# Patient Record
Sex: FEMALE | Race: WHITE | NOT HISPANIC OR LATINO | Employment: FULL TIME | URBAN - METROPOLITAN AREA
[De-identification: names, ages, dates, MRNs, and addresses within clinical notes are randomized per-mention and may not be internally consistent; named-entity substitution may affect disease eponyms.]

---

## 2020-12-11 ENCOUNTER — HOSPITAL ENCOUNTER (EMERGENCY)
Facility: CLINIC | Age: 30
Discharge: HOME | End: 2020-12-11
Attending: EMERGENCY MEDICINE | Admitting: EMERGENCY MEDICINE
Payer: COMMERCIAL

## 2020-12-11 VITALS
HEART RATE: 79 BPM | DIASTOLIC BLOOD PRESSURE: 79 MMHG | BODY MASS INDEX: 22.6 KG/M2 | WEIGHT: 140 LBS | SYSTOLIC BLOOD PRESSURE: 129 MMHG | TEMPERATURE: 99.8 F | RESPIRATION RATE: 16 BRPM

## 2020-12-11 DIAGNOSIS — F22 PARANOIA (H): ICD-10-CM

## 2020-12-11 DIAGNOSIS — F41.9 ANXIETY: ICD-10-CM

## 2020-12-11 DIAGNOSIS — R35.0 URINARY FREQUENCY: ICD-10-CM

## 2020-12-11 LAB
ALBUMIN UR-MCNC: NEGATIVE MG/DL
APPEARANCE UR: CLEAR
BILIRUB UR QL STRIP: NEGATIVE
COLOR UR AUTO: ABNORMAL
GLUCOSE UR STRIP-MCNC: NEGATIVE MG/DL
HGB UR QL STRIP: NEGATIVE
KETONES UR STRIP-MCNC: NEGATIVE MG/DL
LEUKOCYTE ESTERASE UR QL STRIP: NEGATIVE
MUCOUS THREADS #/AREA URNS LPF: PRESENT /LPF
NITRATE UR QL: NEGATIVE
PH UR STRIP: 5.5 PH (ref 5–7)
RBC #/AREA URNS AUTO: 1 /HPF (ref 0–2)
SOURCE: ABNORMAL
SP GR UR STRIP: 1.01 (ref 1–1.03)
SQUAMOUS #/AREA URNS AUTO: 2 /HPF (ref 0–1)
UROBILINOGEN UR STRIP-MCNC: NORMAL MG/DL (ref 0–2)
WBC #/AREA URNS AUTO: 1 /HPF (ref 0–5)

## 2020-12-11 PROCEDURE — 99285 EMERGENCY DEPT VISIT HI MDM: CPT | Mod: 25

## 2020-12-11 PROCEDURE — 81001 URINALYSIS AUTO W/SCOPE: CPT | Performed by: EMERGENCY MEDICINE

## 2020-12-11 PROCEDURE — 90791 PSYCH DIAGNOSTIC EVALUATION: CPT

## 2020-12-11 ASSESSMENT — ENCOUNTER SYMPTOMS
DIARRHEA: 0
PALPITATIONS: 1
FEVER: 0
VOMITING: 0
HALLUCINATIONS: 0
NERVOUS/ANXIOUS: 1
DYSURIA: 0
COUGH: 0

## 2020-12-11 NOTE — ED AVS SNAPSHOT
[facility]  [address]  [facility] [address]  Phone: [phone]  Fax: [phone]                                    Danni Li   MRN: [id number]    Department: [facility]   Date of Visit: 12/11/2020           After Visit Summary Signature Page    I have received my discharge instructions, and my questions have been answered. I have discussed any challenges I see with this plan with the nurse or doctor.    ..........................................................................................................................................  Patient/Patient Representative Signature      ..........................................................................................................................................  Patient Representative Print Name and Relationship to Patient    ..................................................               ................................................  Date                                   Time    ..........................................................................................................................................  Reviewed by Signature/Title    ...................................................              ..............................................  Date                                               Time          22EPIC Rev 08/18

## 2020-12-11 NOTE — ED PROVIDER NOTES
"  History     Chief Complaint:  Paranoia     HPI   Danni Delaney is a 30 year old female with history of ADD, anxiety, depression, and UTI's who presents for evaluation of paranoia/amxiety and urinary frequency. The patient reports that she was at work today, as a pharmaceutical technician, when she started to feel \"scared and disorientated\", further describing that her heart was racing and palpating. She thinks that she possibly could have had a panic attack at work and this was very sudden for her. The patient continues to report that she had a follow up with her psychiatrist on 11/22 which she was re-prescribed Abilify (stopped due to lapse in insurance) and started taking this sometime since then and has been on this for over a week. Since restarting it, she has had increasing anxiety and paranoia. She describes the paranoia to be that she is hearing other people's conversations and is \"picking words out\" that she believes are about her. The patient states that this paranoia ebs and flows and she has not had this for a very long time. She continues to note that she has had similar symptoms in the past, possibly in 2014, and she had been admitted to [facility]. The patient was recently seen at [facility] as well where she was diagnosed with a UTI and she finished a course of Macrobid. She denies any current dysuria, fever, cough, vomiting, diarrhea, hallucinations, or suicidal ideation. She does not express concern for pregnancy and denies alcohol or drug use.     Allergies:  Penicillins    Medications:   albuterol   Abilify  Atomoxetine HCl  Ciprofloxacin   Hydroxyzine   Ativan   Zoloft     Past Medical History:    ADD  Anxiety   Depressive disorder  UTI       Past Surgical History:    Past surgical history reviewed. No pertinent past surgical history.      Family History:    Family history reviewed. No pertinent family history.     Social History:  The patient was unaccompanied to the " ED.  Smoking Status: Current Every Day Smoker  Smokeless Tobacco: Never Used  Alcohol Use: Negative   Drug Use: Positive     Types: Marijuana   PCP: [facility], [facility]     Review of Systems   Constitutional: Negative for fever.        Disoriented feeling   Respiratory: Negative for cough.    Cardiovascular: Positive for palpitations.   Gastrointestinal: Negative for diarrhea and vomiting.   Genitourinary: Negative for dysuria.   Psychiatric/Behavioral: Negative for hallucinations and suicidal ideas. The patient is nervous/anxious.         Paranoia   All other systems reviewed and are negative.    Physical Exam     Patient Vitals for the past 24 hrs:   BP Temp Temp src Pulse Resp Weight   12/11/20 1434 129/79 99.8  F (37.7  C) Oral 79 16 63.5 kg (140 lb)     Physical Exam  VS: Reviewed per above  HENT: Mucous membranes moist  EYES: sclera anicteric  CV: Rate as noted, regular rhythm.   RESP: Effort normal. Breath sounds are normal bilaterally.  GI: no tenderness/rebound/guarding, not distended.  NEURO: Alert, oriented x3 moving all extremities  PSYCH: + paranoia and anxiety, denies SI or AH/VH  MSK: No deformity of the extremities  SKIN: Warm and dry    Emergency Department Course     Laboratory:  Laboratory findings were communicated with the patient who voiced understanding of the findings.    UA: Squamous Epithelial 2 (H), Mucous present (A), o/w WNL.     Emergency Department Course:    1458 The patient provided a urine sample here in the emergency department. This was sent for laboratory testing, findings above.     1507 Nursing notes and vitals reviewed. I performed an exam of the patient as documented above. A mental health evaluation was ordered. DEC will meet with the patient when available; please see note for further details.      1520 I spoke with the patient's , Simeon, regarding patient's presentation, findings, and plan of care.     1624 I spoke with DEC post assessment regarding patient's  presentation, findings, and plan of care.     1632 Patient rechecked and updated. Prior to discharge, I personally reviewed the results with the patient and all related questions were answered. The patient verbalized understanding and is amenable to plan.     Impression & Plan      Medical Decision Making:  Danni Delaney is a 30 year old female who presents to the emergency department today for evaluation of urinary frequency, paranoia and anxiety.  On arrival vital signs are reassuring.  On exam patient denies any suicidal thoughts or auditory or visual hallucinations.  She just reports paranoid thoughts and episode of anxiety this morning.  She is also worried that her urine could still be infected but it does not appear to be infected on urinalysis today.  With her permission, I spoke with her  over the phoneand he corroborated her story without additional concerns.  DEC met with the patient who reiterated close therapy and psychiatry follow-up.  I agree that patient does not require inpatient psychiatric stabilization at this time.  Patient is agreeable with this plan.  She was discharged in the care of her .    Diagnosis:    ICD-10-CM    1. Paranoia (H)  F22    2. Anxiety  F41.9    3. Urinary frequency  R35.0      Disposition:   The patient is discharged to home.     Discharge Medications:  New Prescriptions    No medications on file     Scribe Disclosure:  I, Orla Severson, am serving as a scribe at 2:51 PM on 12/11/2020 to document services personally performed by Aman Suazo MD based on my observations and the provider's statements to me.     [facility] EMERGENCY DEPT         Aman Suazo MD  12/11/20 5161

## 2020-12-11 NOTE — DISCHARGE INSTRUCTIONS
Discharge Instructions  Mental Health Concerns    You were seen today for mental health concerns, such as depression, anxiety, or suicidal thinking. Your provider feels that you do not require hospitalization at this time. However, your symptoms may become worse, and you may need to return to the Emergency Department. Most treatments of depression and suicidal thoughts are a process rather than a single intervention.  Medications and counseling can take several weeks or more to help.    Generally, every Emergency Department visit should have a follow-up clinic visit with either a primary or a specialty clinic/provider. Please follow-up as instructed by your emergency provider today.    By accepting these discharge instructions:  You promise to not harm yourself or others.  You agree that if you feel you are becoming unable to keep that promise, you will do something to help yourself before you do anything to harm yourself or others.   You agree to keep any safety plan arranged on your visit here today.  You agree to take any medication prescribed or recommended by your provider.  If you are getting worse, you can contact a friend or a family member, contact your counselor or family provider, contact a crisis line, or other options discussed with the provider or therapist today.  At any time, you can call 911 and return to the Emergency Department for more help.  You understand that follow-up is essential to your treatment, and you will make and keep appointments recommended on your visit today.    How to improve your mental health and prevent suicide:  Involve others by letting family, friends, counselors know.  Do not isolate yourself.  Avoid alcohol or drugs. Remove weapons, poisons from your home.  Try to stick to routines for eating, sleeping and getting regular exercise.    Try to get into sunlight. Bright natural light not only treats seasonal affective disorder but also depression.  Increase safe activities  that you enjoy.    If you feel worse, contact 1-800-suicide (1-788.621.5388), or call 911, or your primary provider/counselor for additional assistance.    If you were given a prescription for medicine here today, be sure to read all of the information (including the package insert) that comes with your prescription.  This will include important information about the medicine, its side effects, and any warnings that you need to know about.  The pharmacist who fills the prescription can provide more information and answer questions you may have about the medicine.  If you have questions or concerns that the pharmacist cannot address, please call or return to the Emergency Department.   Remember that you can always come back to the Emergency Department if you are not able to see your regular provider in the amount of time listed above, if you get any new symptoms, or if there is anything that worries you.

## 2020-12-11 NOTE — ED TRIAGE NOTES
"Patient reports recent UTI, now with frequency and reporting malodorous urine. She is also reporting anxiety and paranoia \"like people are talking about me\". She has nasal congestion and feels weak. Alert and oriented x 4.    "

## 2021-08-05 ENCOUNTER — HOSPITAL ENCOUNTER (EMERGENCY)
Facility: CLINIC | Age: 31
Discharge: HOME | End: 2021-08-05
Attending: EMERGENCY MEDICINE | Admitting: EMERGENCY MEDICINE
Payer: COMMERCIAL

## 2021-08-05 ENCOUNTER — APPOINTMENT (OUTPATIENT)
Dept: CT IMAGING | Facility: CLINIC | Age: 31
End: 2021-08-05
Attending: EMERGENCY MEDICINE
Payer: COMMERCIAL

## 2021-08-05 VITALS
RESPIRATION RATE: 18 BRPM | WEIGHT: 142 LBS | OXYGEN SATURATION: 100 % | TEMPERATURE: 96.6 F | DIASTOLIC BLOOD PRESSURE: 89 MMHG | HEART RATE: 84 BPM | BODY MASS INDEX: 22.92 KG/M2 | SYSTOLIC BLOOD PRESSURE: 118 MMHG

## 2021-08-05 DIAGNOSIS — M54.2 NECK PAIN ON LEFT SIDE: ICD-10-CM

## 2021-08-05 DIAGNOSIS — M54.6 ACUTE LEFT-SIDED THORACIC BACK PAIN: ICD-10-CM

## 2021-08-05 LAB — B-HCG FREE SERPL-ACNC: <5 IU/L (ref 0–5)

## 2021-08-05 PROCEDURE — 250N000011 HC RX IP 250 OP 636: Performed by: EMERGENCY MEDICINE

## 2021-08-05 PROCEDURE — 99285 EMERGENCY DEPT VISIT HI MDM: CPT | Mod: 25

## 2021-08-05 PROCEDURE — 250N000009 HC RX 250: Performed by: EMERGENCY MEDICINE

## 2021-08-05 PROCEDURE — 250N000013 HC RX MED GY IP 250 OP 250 PS 637: Performed by: EMERGENCY MEDICINE

## 2021-08-05 PROCEDURE — 96374 THER/PROPH/DIAG INJ IV PUSH: CPT | Mod: 59

## 2021-08-05 PROCEDURE — 70498 CT ANGIOGRAPHY NECK: CPT

## 2021-08-05 PROCEDURE — 84702 CHORIONIC GONADOTROPIN TEST: CPT

## 2021-08-05 RX ORDER — KETOROLAC TROMETHAMINE 15 MG/ML
10 INJECTION, SOLUTION INTRAMUSCULAR; INTRAVENOUS ONCE
Status: COMPLETED | OUTPATIENT
Start: 2021-08-05 | End: 2021-08-05

## 2021-08-05 RX ORDER — LIDOCAINE 50 MG/G
1 PATCH TOPICAL EVERY 24 HOURS
Qty: 5 PATCH | Refills: 0 | Status: SHIPPED | OUTPATIENT
Start: 2021-08-05 | End: 2021-08-12

## 2021-08-05 RX ORDER — IOPAMIDOL 755 MG/ML
500 INJECTION, SOLUTION INTRAVASCULAR ONCE
Status: COMPLETED | OUTPATIENT
Start: 2021-08-05 | End: 2021-08-05

## 2021-08-05 RX ORDER — CYCLOBENZAPRINE HCL 10 MG
10 TABLET ORAL 3 TIMES DAILY PRN
Qty: 12 TABLET | Refills: 0 | Status: SHIPPED | OUTPATIENT
Start: 2021-08-05 | End: 2021-08-11

## 2021-08-05 RX ORDER — HYDROMORPHONE HYDROCHLORIDE 1 MG/ML
0.5 INJECTION, SOLUTION INTRAMUSCULAR; INTRAVENOUS; SUBCUTANEOUS ONCE
Status: DISCONTINUED | OUTPATIENT
Start: 2021-08-05 | End: 2021-08-05 | Stop reason: HOSPADM

## 2021-08-05 RX ORDER — CYCLOBENZAPRINE HCL 10 MG
10 TABLET ORAL ONCE
Status: COMPLETED | OUTPATIENT
Start: 2021-08-05 | End: 2021-08-05

## 2021-08-05 RX ADMIN — SODIUM CHLORIDE 80 ML: 9 INJECTION, SOLUTION INTRAVENOUS at 12:18

## 2021-08-05 RX ADMIN — CYCLOBENZAPRINE HYDROCHLORIDE 10 MG: 10 TABLET, FILM COATED ORAL at 10:31

## 2021-08-05 RX ADMIN — KETOROLAC TROMETHAMINE 10 MG: 15 INJECTION, SOLUTION INTRAMUSCULAR; INTRAVENOUS at 10:31

## 2021-08-05 RX ADMIN — IOPAMIDOL 70 ML: 755 INJECTION, SOLUTION INTRAVENOUS at 12:18

## 2021-08-05 ASSESSMENT — ENCOUNTER SYMPTOMS
NECK PAIN: 1
BACK PAIN: 1
NECK STIFFNESS: 1

## 2021-08-05 NOTE — ED TRIAGE NOTES
Pt arrives with c/o back and neck pain that started this morning. Patient reports she woke up this morning with her arms above her head and they were numb and tingling. Now pt reports pain to neck, into shoulders, and back. ABCs intact.

## 2021-08-05 NOTE — ED PROVIDER NOTES
History   Chief Complaint:  Back Pain and Neck Pain       HPI   Danni Li is a 31 year old female who presents with back pain and neck pain. Patient reports she woke up this morning with her arms above her head like a dead weight, she notes this is not normal for her. She woke up before her alarm and wasn't able to move. Her upper back and neck were stiff and her pain feels like tightness. She put icy hot on her upper back and took Tylenol with little relief in symptoms. The icy hot made her neck and upper back numb but she is not sure if this is helping her pain. Patient denies any previous neck or back problems or chance of pregnancy. She also notes her legs feel normal.     Review of Systems   Musculoskeletal: Positive for back pain, neck pain and neck stiffness.   All other systems reviewed and are negative.        Allergies:  Penicillins    Medications:  Albuterol  Abilify  Strattera   Cipro  Vicodin  Atarax  Ativan  Zoloft  Vistaril  Desyrel    Past Medical History:    ADD  Anxiety  Depressive disorder  UTI    Social History:  Patient presents with her .     Physical Exam     Patient Vitals for the past 24 hrs:   BP Temp Temp src Pulse Resp SpO2 Weight   08/05/21 1001 -- -- -- -- -- -- 64.4 kg (142 lb)   08/05/21 0950 (!) 116/94 (!) 96.6  F (35.9  C) Temporal 99 18 100 % --       Physical Exam    HENT:  external ears unremarkable, Nares clear bilaterally, mmm, oropharynx without tonsillar hypertrophy/erythema/exudate    Eyes: PERRL, measuring 4mm bilaterally, EOMI, visual acuity and fields intact, conjunctiva and lids normal    Neck: supple, painless ROM, no cervical lymphadenopathy, no bruit    Lungs:  CTAB,  no resp distress    CV: rrr, no m/r/g, ppi    Abd: soft, nontender, nondistended, no rebound/masses/guarding/hsm    Ext: no peripheral edema.  + ttp L lower cervical paraspinous muscle and trapezius bilaterall L >> R.  Tenderness extends to L medial scapula.  No C or T midline ttp.   Mild erythema over tender area (icy hot applied PTA)     Skin: warm, dry, well perused    Neuro:   alert, follows commands, speech clear, CN 2-12 intact,   strength 5/5 and symmetric in BUE intrinsic hand muscles as well as BLE  SILT in all 4 ext  Negative Pronator drift  cerebellar testing unremarkable      Psych: Normal mood, normal affect        Emergency Department Course   Imaging:  CTA Neck with Contrast   1.  No significant stenosis of the neck vessels by NASCET criteria. No   finding for dissection.   Per radiology    Laboratory:  iStat HCG Quantitative Pregnancy, POCT: <5.0    Emergency Department Course:    Reviewed:  I reviewed nursing notes, vitals, past medical history and care everywhere    Assessments:  1000 I obtained history and examined the patient as noted above.   1254 I rechecked the patient and explained findings.   1317 I rechecked the patient and explained findings.     Interventions:  1031 Toradol 10 mg IV  1031 Flexeril 10 mg PO    Disposition:  The patient was discharged to home.       Impression & Plan       Medical Decision Makiny F here with atraumatic L upper back and neck pain.  No neuro exam deficits, intact perfusion/pulses BUE and BLE.  CT with no e/o dissection.  No suspicion for deep space infection, atypical presentation of ACS, PE, etc.   Suspect msk.  DC home supportive care.         Covid-19  Danni Li was evaluated during a global COVID-19 pandemic, which necessitated consideration that the patient might be at risk for infection with the SARS-CoV-2 virus that causes COVID-19.   Applicable protocols for evaluation were followed during the patient's care.     Diagnosis:    ICD-10-CM    1. Acute left-sided thoracic back pain  M54.6    2. Neck pain on left side  M54.2        Discharge Medications:  New Prescriptions    CYCLOBENZAPRINE (FLEXERIL) 10 MG TABLET    Take 1 tablet (10 mg) by mouth 3 times daily as needed for muscle spasms    LIDOCAINE (LIDODERM) 5 %  PATCH    Place 1 patch onto the skin every 24 hours To prevent lidocaine toxicity, patient should be patch free for 12 hrs daily.       Scribe Disclosure:  I, Sharron Adams, am serving as a scribe at 10:00 AM on 8/5/2021 to document services personally performed by Elmer Pearson, based on my observations and the provider's statements to me.            Elmer Pearson MD  08/05/21 2023       Elmre Pearson MD  08/05/21 2023

## 2021-08-12 ENCOUNTER — OFFICE VISIT (OUTPATIENT)
Dept: INTERNAL MEDICINE | Facility: CLINIC | Age: 31
End: 2021-08-12
Payer: COMMERCIAL

## 2021-08-12 VITALS
TEMPERATURE: 98.3 F | HEIGHT: 66 IN | DIASTOLIC BLOOD PRESSURE: 62 MMHG | SYSTOLIC BLOOD PRESSURE: 126 MMHG | WEIGHT: 152 LBS | OXYGEN SATURATION: 99 % | HEART RATE: 94 BPM | BODY MASS INDEX: 24.43 KG/M2 | RESPIRATION RATE: 16 BRPM

## 2021-08-12 DIAGNOSIS — M54.6 ACUTE BILATERAL THORACIC BACK PAIN: Primary | ICD-10-CM

## 2021-08-12 PROCEDURE — 99203 OFFICE O/P NEW LOW 30 MIN: CPT | Performed by: NURSE PRACTITIONER

## 2021-08-12 RX ORDER — CETIRIZINE HYDROCHLORIDE 10 MG/1
10 TABLET ORAL DAILY
COMMUNITY

## 2021-08-12 RX ORDER — ACETAMINOPHEN 325 MG/1
325-650 TABLET ORAL EVERY 6 HOURS PRN
COMMUNITY

## 2021-08-12 RX ORDER — PRAZOSIN HYDROCHLORIDE 1 MG/1
CAPSULE ORAL
COMMUNITY
Start: 2021-08-06

## 2021-08-12 ASSESSMENT — MIFFLIN-ST. JEOR: SCORE: 1416.22

## 2021-08-12 NOTE — PROGRESS NOTES
"    Assessment & Plan     Acute bilateral thoracic back pain    - [facility] Referral; Future    NSAIDS, lidocaine patches, heat,ROM             Lakeisha Silverio NP  [facility] GABY Razo is a 31 year old who presents for the following health issues  accompanied by her spouse, Simeon:   ED follow up  HPI     ED/UC Followup:    Facility:  [facility]  Date of visit: 8/5/2021  Reason for visit: Back and neck pain.  Current Status: mildly improved, stiffness upper back, neck ,shoulders. Using lido patch, muscle relaxants prn.           Review of Systems   Constitutional, HEENT, cardiovascular, pulmonary, gi and gu systems are negative, except as otherwise noted.      Objective    /62   Pulse 94   Temp 98.3  F (36.8  C) (Oral)   Resp 16   Ht 1.676 m (5' 6\")   Wt 68.9 kg (152 lb)   LMP  (LMP Unknown)   SpO2 99%   Breastfeeding No   BMI 24.53 kg/m    Body mass index is 24.53 kg/m .  Physical Exam   GENERAL: alert and mild discomfort  NECK: no adenopathy, no asymmetry, masses, or scars and thyroid normal to palpation  NECK: significant muscle tension and tenderness of SCM, Traps, spinal muscles thoracic spine.  Multiple areas muscle knots  RESP: lungs clear to auscultation - no rales, rhonchi or wheezes  PSYCH: mentation appears normal and anxious                "

## 2022-02-20 ENCOUNTER — APPOINTMENT (OUTPATIENT)
Dept: CT IMAGING | Facility: CLINIC | Age: 32
End: 2022-02-20
Attending: EMERGENCY MEDICINE
Payer: COMMERCIAL

## 2022-02-20 ENCOUNTER — HOSPITAL ENCOUNTER (EMERGENCY)
Facility: CLINIC | Age: 32
Discharge: HOME | End: 2022-02-20
Attending: EMERGENCY MEDICINE | Admitting: EMERGENCY MEDICINE
Payer: COMMERCIAL

## 2022-02-20 ENCOUNTER — APPOINTMENT (OUTPATIENT)
Dept: ULTRASOUND IMAGING | Facility: CLINIC | Age: 32
End: 2022-02-20
Attending: EMERGENCY MEDICINE
Payer: COMMERCIAL

## 2022-02-20 VITALS
SYSTOLIC BLOOD PRESSURE: 116 MMHG | RESPIRATION RATE: 20 BRPM | TEMPERATURE: 97.4 F | DIASTOLIC BLOOD PRESSURE: 69 MMHG | HEART RATE: 85 BPM | OXYGEN SATURATION: 98 % | WEIGHT: 152.12 LBS | BODY MASS INDEX: 24.55 KG/M2

## 2022-02-20 DIAGNOSIS — N83.201 CYST OF RIGHT OVARY: ICD-10-CM

## 2022-02-20 LAB
ALBUMIN SERPL-MCNC: 3.9 G/DL (ref 3.4–5)
ALBUMIN UR-MCNC: NEGATIVE MG/DL
ALP SERPL-CCNC: 47 U/L (ref 40–150)
ALT SERPL W P-5'-P-CCNC: 29 U/L (ref 0–50)
ANION GAP SERPL CALCULATED.3IONS-SCNC: 3 MMOL/L (ref 3–14)
APPEARANCE UR: CLEAR
AST SERPL W P-5'-P-CCNC: 20 U/L (ref 0–45)
B-HCG SERPL-ACNC: <1 IU/L (ref 0–5)
BASOPHILS # BLD AUTO: 0.1 10E3/UL (ref 0–0.2)
BASOPHILS NFR BLD AUTO: 1 %
BILIRUB SERPL-MCNC: 0.4 MG/DL (ref 0.2–1.3)
BILIRUB UR QL STRIP: NEGATIVE
BUN SERPL-MCNC: 14 MG/DL (ref 7–30)
CALCIUM SERPL-MCNC: 9.7 MG/DL (ref 8.5–10.1)
CHLORIDE BLD-SCNC: 107 MMOL/L (ref 94–109)
CO2 SERPL-SCNC: 30 MMOL/L (ref 20–32)
COLOR UR AUTO: NORMAL
CREAT SERPL-MCNC: 0.77 MG/DL (ref 0.52–1.04)
EOSINOPHIL # BLD AUTO: 0.3 10E3/UL (ref 0–0.7)
EOSINOPHIL NFR BLD AUTO: 4 %
ERYTHROCYTE [DISTWIDTH] IN BLOOD BY AUTOMATED COUNT: 12.6 % (ref 10–15)
GFR SERPL CREATININE-BSD FRML MDRD: >90 ML/MIN/1.73M2
GLUCOSE BLD-MCNC: 81 MG/DL (ref 70–99)
GLUCOSE UR STRIP-MCNC: NEGATIVE MG/DL
HCT VFR BLD AUTO: 45.1 % (ref 35–47)
HGB BLD-MCNC: 14.4 G/DL (ref 11.7–15.7)
HGB UR QL STRIP: NEGATIVE
IMM GRANULOCYTES # BLD: 0 10E3/UL
IMM GRANULOCYTES NFR BLD: 0 %
KETONES UR STRIP-MCNC: NEGATIVE MG/DL
LACTATE SERPL-SCNC: 0.7 MMOL/L (ref 0.7–2)
LEUKOCYTE ESTERASE UR QL STRIP: NEGATIVE
LYMPHOCYTES # BLD AUTO: 1.7 10E3/UL (ref 0.8–5.3)
LYMPHOCYTES NFR BLD AUTO: 23 %
MCH RBC QN AUTO: 30.4 PG (ref 26.5–33)
MCHC RBC AUTO-ENTMCNC: 31.9 G/DL (ref 31.5–36.5)
MCV RBC AUTO: 95 FL (ref 78–100)
MONOCYTES # BLD AUTO: 0.6 10E3/UL (ref 0–1.3)
MONOCYTES NFR BLD AUTO: 8 %
NEUTROPHILS # BLD AUTO: 4.9 10E3/UL (ref 1.6–8.3)
NEUTROPHILS NFR BLD AUTO: 64 %
NITRATE UR QL: NEGATIVE
NRBC # BLD AUTO: 0 10E3/UL
NRBC BLD AUTO-RTO: 0 /100
PH UR STRIP: 6.5 [PH] (ref 5–7)
PLATELET # BLD AUTO: 183 10E3/UL (ref 150–450)
POTASSIUM BLD-SCNC: 3.6 MMOL/L (ref 3.4–5.3)
PROT SERPL-MCNC: 6.8 G/DL (ref 6.8–8.8)
RBC # BLD AUTO: 4.73 10E6/UL (ref 3.8–5.2)
RBC URINE: <1 /HPF
SODIUM SERPL-SCNC: 140 MMOL/L (ref 133–144)
SP GR UR STRIP: 1.01 (ref 1–1.03)
SQUAMOUS EPITHELIAL: <1 /HPF
UROBILINOGEN UR STRIP-MCNC: NORMAL MG/DL
WBC # BLD AUTO: 7.6 10E3/UL (ref 4–11)
WBC URINE: 1 /HPF

## 2022-02-20 PROCEDURE — 96374 THER/PROPH/DIAG INJ IV PUSH: CPT | Mod: 59

## 2022-02-20 PROCEDURE — 250N000011 HC RX IP 250 OP 636: Performed by: EMERGENCY MEDICINE

## 2022-02-20 PROCEDURE — 80053 COMPREHEN METABOLIC PANEL: CPT | Performed by: EMERGENCY MEDICINE

## 2022-02-20 PROCEDURE — 250N000009 HC RX 250: Performed by: EMERGENCY MEDICINE

## 2022-02-20 PROCEDURE — 99285 EMERGENCY DEPT VISIT HI MDM: CPT | Mod: 25

## 2022-02-20 PROCEDURE — 93976 VASCULAR STUDY: CPT

## 2022-02-20 PROCEDURE — 74177 CT ABD & PELVIS W/CONTRAST: CPT

## 2022-02-20 PROCEDURE — 85025 COMPLETE CBC W/AUTO DIFF WBC: CPT | Performed by: EMERGENCY MEDICINE

## 2022-02-20 PROCEDURE — 84702 CHORIONIC GONADOTROPIN TEST: CPT | Performed by: EMERGENCY MEDICINE

## 2022-02-20 PROCEDURE — 83605 ASSAY OF LACTIC ACID: CPT | Performed by: EMERGENCY MEDICINE

## 2022-02-20 PROCEDURE — 81001 URINALYSIS AUTO W/SCOPE: CPT | Performed by: EMERGENCY MEDICINE

## 2022-02-20 PROCEDURE — 36415 COLL VENOUS BLD VENIPUNCTURE: CPT | Performed by: EMERGENCY MEDICINE

## 2022-02-20 RX ORDER — OXYCODONE HYDROCHLORIDE 5 MG/1
5 TABLET ORAL EVERY 6 HOURS PRN
Qty: 12 TABLET | Refills: 0 | Status: SHIPPED | OUTPATIENT
Start: 2022-02-20 | End: 2022-02-23

## 2022-02-20 RX ORDER — HYDROMORPHONE HYDROCHLORIDE 1 MG/ML
0.5 INJECTION, SOLUTION INTRAMUSCULAR; INTRAVENOUS; SUBCUTANEOUS
Status: COMPLETED | OUTPATIENT
Start: 2022-02-20 | End: 2022-02-20

## 2022-02-20 RX ORDER — IOPAMIDOL 755 MG/ML
500 INJECTION, SOLUTION INTRAVASCULAR ONCE
Status: COMPLETED | OUTPATIENT
Start: 2022-02-20 | End: 2022-02-20

## 2022-02-20 RX ORDER — MORPHINE SULFATE 4 MG/ML
4 INJECTION, SOLUTION INTRAMUSCULAR; INTRAVENOUS
Status: DISCONTINUED | OUTPATIENT
Start: 2022-02-20 | End: 2022-02-20 | Stop reason: HOSPADM

## 2022-02-20 RX ORDER — ONDANSETRON 2 MG/ML
4 INJECTION INTRAMUSCULAR; INTRAVENOUS EVERY 30 MIN PRN
Status: DISCONTINUED | OUTPATIENT
Start: 2022-02-20 | End: 2022-02-20 | Stop reason: HOSPADM

## 2022-02-20 RX ADMIN — HYDROMORPHONE HYDROCHLORIDE 0.5 MG: 1 INJECTION, SOLUTION INTRAMUSCULAR; INTRAVENOUS; SUBCUTANEOUS at 19:00

## 2022-02-20 RX ADMIN — SODIUM CHLORIDE 59 ML: 9 INJECTION, SOLUTION INTRAVENOUS at 18:08

## 2022-02-20 RX ADMIN — IOPAMIDOL 76 ML: 755 INJECTION, SOLUTION INTRAVENOUS at 18:08

## 2022-02-20 ASSESSMENT — ENCOUNTER SYMPTOMS
DYSURIA: 0
BLOOD IN STOOL: 0
VOMITING: 0
SHORTNESS OF BREATH: 0
FEVER: 0
CHILLS: 0
HEMATURIA: 0
DIARRHEA: 0
ABDOMINAL PAIN: 1
NAUSEA: 0

## 2022-02-21 NOTE — DISCHARGE INSTRUCTIONS

## 2022-02-21 NOTE — ED PROVIDER NOTES
History     Chief Complaint:  Abdominal Pain      HPI   Danni Li is a 32 year old female who presents with lower abdominal pain that began suddenly this morning at approximately 11 AM.  Notes that it radiates down into her pelvis but denies any fever, chills, nausea or vomiting.  Denies any urinary symptoms.  Denies any vaginal bleeding.  No recent trauma to the area.  Has not had similar pain like this before in the past.  No history of ovarian cysts.    Review of Systems   Constitutional: Negative for chills and fever.   Respiratory: Negative for shortness of breath.    Cardiovascular: Negative for chest pain.   Gastrointestinal: Positive for abdominal pain (Lower abdominal). Negative for blood in stool, diarrhea, nausea and vomiting.   Genitourinary: Negative for dysuria, hematuria and vaginal bleeding.   All other systems reviewed and are negative.    Allergies:  Penicillins    Medications:    oxyCODONE (ROXICODONE) 5 MG tablet  acetaminophen (TYLENOL) 325 MG tablet  cetirizine (ZYRTEC) 10 MG tablet  ibuprofen (ADVIL,MOTRIN) 800 MG tablet  Multiple Vitamins-Minerals (CENTRUM VITAMINTS) CHEW  prazosin (MINIPRESS) 1 MG capsule      Past Medical History:    Past Medical History:   Diagnosis Date     ADD (attention deficit disorder)      Anxiety      Depressive disorder      UTI (lower urinary tract infection) 11/16/2011     Past Surgical History:    No pertinent past surgical history    Family History:    No pertinent past family history    Social History:  Presents the emergency department with her significant other    Physical Exam     Patient Vitals for the past 24 hrs:   BP Temp Temp src Pulse Resp SpO2 Weight   02/20/22 2000 116/69 -- -- -- -- 98 % --   02/20/22 1905 -- -- -- 85 -- 100 % --   02/20/22 1711 120/73 97.4  F (36.3  C) Temporal 71 20 99 % --   02/20/22 1710 -- -- -- -- -- -- 69 kg (152 lb 1.9 oz)     Physical Exam  General: Patient is awake, alert and interactive when I enter the  room. Appears uncomfortable   Head: The scalp, face, and head appear normal  Neck: Normal range of motion.   CV: Regular rate. S1/S2. No murmurs.   Resp: Lungs are clear without wheezes or rales. No respiratory distress.   GI: Periumbilical tenderness.  Abdomen is soft, no rigidity. No evidence of pulsatile mass. No fluid waves or evidence of ascites. No distension No CVA tenderness.     MS: Normal tone. Joints grossly normal without effusions. No asymmetric leg swelling, calf or thigh tenderness.    Skin: No rash or lesions noted. Normal capillary refill noted  Neuro:Speech is normal and fluent. Face is symmetric. Moving all extremities.   Psych:  Normal affect.  Appropriate interactions.    Emergency Department Course     Imaging:  US Pelvis Cmplt w Transvag & Doppler LmtPel Duplex Limited   Final Result   IMPRESSION:     1.  Normal-appearing uterus and ovaries with IUD in place.   2.  Small amount of simple, free fluid in the cul-de-sac.               CT Abdomen Pelvis w Contrast   Final Result   IMPRESSION:    1.  Trace free fluid in the pelvis. Collapsing right ovarian follicle measuring 2 cm. No acute abnormality in the abdomen or pelvis.          Laboratory:  Labs Ordered and Resulted from Time of ED Arrival to Time of ED Departure   COMPREHENSIVE METABOLIC PANEL - Normal       Result Value    Sodium 140      Potassium 3.6      Chloride 107      Carbon Dioxide (CO2) 30      Anion Gap 3      Urea Nitrogen 14      Creatinine 0.77      Calcium 9.7      Glucose 81      Alkaline Phosphatase 47      AST 20      ALT 29      Protein Total 6.8      Albumin 3.9      Bilirubin Total 0.4      GFR Estimate >90     LACTIC ACID WHOLE BLOOD - Normal    Lactic Acid 0.7     ROUTINE UA WITH MICROSCOPIC REFLEX TO CULTURE - Normal    Color Urine Straw      Appearance Urine Clear      Glucose Urine Negative      Bilirubin Urine Negative      Ketones Urine Negative      Specific Gravity Urine 1.007      Blood Urine Negative      pH  Urine 6.5      Protein Albumin Urine Negative      Urobilinogen Urine Normal      Nitrite Urine Negative      Leukocyte Esterase Urine Negative      RBC Urine <1      WBC Urine 1      Squamous Epithelials Urine <1     HCG QUANTITATIVE PREGNANCY - Normal    hCG Quantitative <1     CBC WITH PLATELETS AND DIFFERENTIAL    WBC Count 7.6      RBC Count 4.73      Hemoglobin 14.4      Hematocrit 45.1      MCV 95      MCH 30.4      MCHC 31.9      RDW 12.6      Platelet Count 183      % Neutrophils 64      % Lymphocytes 23      % Monocytes 8      % Eosinophils 4      % Basophils 1      % Immature Granulocytes 0      NRBCs per 100 WBC 0      Absolute Neutrophils 4.9      Absolute Lymphocytes 1.7      Absolute Monocytes 0.6      Absolute Eosinophils 0.3      Absolute Basophils 0.1      Absolute Immature Granulocytes 0.0      Absolute NRBCs 0.0         Emergency Department Course:    Reviewed:  I reviewed nursing notes, vitals, past history and care everywhere      Interventions:  Medications   iopamidol (ISOVUE-370) solution 500 mL (76 mLs Intravenous Given 2/20/22 1808)   for CT scan flush use (59 mLs Intravenous Given 2/20/22 1808)   HYDROmorphone (PF) (DILAUDID) injection 0.5 mg (0.5 mg Intravenous Given 2/20/22 1900)     Disposition:  The patient was discharged to home.    Impression & Plan        Medical Decision Making:  Danni Li is a 32 year old female who presents with lower abdominal pain. A broad differential diagnosis was considered including colitis, appendicitis, intestinal cramping, pyelonephritis, UTI, kidney stone, constipation, diverticulitis, volvulus, ileus, obstruction, pregnancy (ectopic or intrauterine), ovarian cyst (enlarged or ruptured), ovarian torsion, PID, etc as possibilities. The workup in the ED shows ovarian cyst on CT scan.  US no signs of torsion or tubo-ovarian abscess.  She is not pregnant. She understands this may or not be the source of pain. No other etiology for the patients  pain is found at this point and my suspicion of an intraabdominal catastrophe or other worrisome etiology is very low.  I will not therefore admit for serial exams and further workup.  Patient is hemodynamically stable in ED. Return for fevers greater than 102, increasing pain, other new symptoms develop.  Abdominal pain handout given.  Questions were answered.      Diagnosis:    ICD-10-CM    1. Cyst of right ovary  N83.201        Discharge Medications:  Discharge Medication List as of 2/20/2022  8:57 PM      START taking these medications    Details   oxyCODONE (ROXICODONE) 5 MG tablet Take 1 tablet (5 mg) by mouth every 6 hours as needed for pain, Disp-12 tablet, R-0, Local Print             MD Christopher James, Herve Bailey MD  02/21/22 6827

## 2022-04-09 ENCOUNTER — HEALTH MAINTENANCE LETTER (OUTPATIENT)
Age: 32
End: 2022-04-09

## 2022-04-12 ENCOUNTER — LAB REQUISITION (OUTPATIENT)
Dept: LAB | Facility: CLINIC | Age: 32
End: 2022-04-12

## 2022-04-12 PROCEDURE — 86481 TB AG RESPONSE T-CELL SUSP: CPT | Performed by: INTERNAL MEDICINE

## 2022-04-12 PROCEDURE — 86787 VARICELLA-ZOSTER ANTIBODY: CPT | Performed by: INTERNAL MEDICINE

## 2022-04-13 LAB
GAMMA INTERFERON BACKGROUND BLD IA-ACNC: 0.02 IU/ML
M TB IFN-G BLD-IMP: NEGATIVE
M TB IFN-G CD4+ BCKGRND COR BLD-ACNC: 9.98 IU/ML
MITOGEN IGNF BCKGRD COR BLD-ACNC: 0 IU/ML
MITOGEN IGNF BCKGRD COR BLD-ACNC: 0.01 IU/ML
QUANTIFERON MITOGEN: 10 IU/ML
QUANTIFERON NIL TUBE: 0.02 IU/ML
QUANTIFERON TB1 TUBE: 0.02 IU/ML
QUANTIFERON TB2 TUBE: 0.03
VZV IGG SER QL IA: 1345 INDEX
VZV IGG SER QL IA: POSITIVE

## 2022-04-18 ENCOUNTER — TELEPHONE (OUTPATIENT)
Dept: INTERNAL MEDICINE | Facility: CLINIC | Age: 32
End: 2022-04-18

## 2022-04-18 NOTE — LETTER
April 18, 2022      Danni Li  [address] [place] [address]        Dear Danni,    At [facility] we care about your health and well-being.  A review of your chart has indicated that you are due for a Pap. Please please contact us at [phone] to schedule an appointment .        Sincerely    Lakeisha Silverio CNP          Sincerely,

## 2022-04-18 NOTE — LETTER
October 4, 2022      Danni [place]  [address] [address] [address]        Dear Danni,    October 4, 2022      Danni Li  [address] [address] [address]      Your healthcare team cares about your health. To provide you with the best care,   we have reviewed your chart and based on our findings, we see that you are due to:     - ANNUAL WELLNESS FOLLOW UP:   Schedule an Annual Medicare Wellness Exam. This can be done by in person visit or virtual video visit.     If you have already completed these items, please contact the clinic via phone or   [email or web address] so your care team can review and update your records. Thank you for   choosing [facility] for your healthcare needs. For any questions,   concerns, or to schedule an appointment please contact the clinic.       Healthy Regards,      Your [facility] Care Team

## 2022-04-18 NOTE — TELEPHONE ENCOUNTER
Patient Quality Outreach    Patient is due for the following:   Cervical Cancer Screening - PAP Needed    NEXT STEPS:   Schedule a office visit for pap    Type of outreach:    Sent letter.    Next Steps:  Reach out within 90 days via Phone.    Max number of attempts reached: No. Will try again in 90 days if patient still on fail list.    Questions for provider review:    None     Bre Reese MA  Chart routed to no one.          .Patient Quality Outreach    Patient is due for the following:   Cervical Cancer Screening - PAP Needed    NEXT STEPS:   Schedule a yearly physical    Type of outreach:    Sent letter.    Next Steps:  Reach out within 90 days via Phone.    Max number of attempts reached: No. Will try again in 90 days if patient still on fail list.    Questions for provider review:    None     Bre Reese MA  Chart routed to self.

## 2022-04-18 NOTE — LETTER
June 16, 2022      [place]  [address] [address] [address]        Dear Danni,    June 16, 2022      [address]  [address] [address] [facility] [address]      Your healthcare team cares about your health. To provide you with the best care,   we have reviewed your chart and based on our findings, we see that you are due to:     - CERVICAL CANCER SCREENING: Schedule a Cervical Cancer Screening, with Pap and wellness exam.     If you have already completed these items, please contact the clinic via phone or   [email or web address] so your care team can review and update your records. Thank you for   choosing [facility] for your healthcare needs. For any questions,   concerns, or to schedule an appointment please contact the clinic.       Healthy Regards,      Your [facility] Care Team

## 2022-07-28 ENCOUNTER — APPOINTMENT (OUTPATIENT)
Dept: CT IMAGING | Facility: CLINIC | Age: 32
End: 2022-07-28
Attending: STUDENT IN AN ORGANIZED HEALTH CARE EDUCATION/TRAINING PROGRAM
Payer: COMMERCIAL

## 2022-07-28 ENCOUNTER — HOSPITAL ENCOUNTER (EMERGENCY)
Facility: CLINIC | Age: 32
Discharge: HOME | End: 2022-07-28
Attending: STUDENT IN AN ORGANIZED HEALTH CARE EDUCATION/TRAINING PROGRAM | Admitting: STUDENT IN AN ORGANIZED HEALTH CARE EDUCATION/TRAINING PROGRAM
Payer: COMMERCIAL

## 2022-07-28 ENCOUNTER — APPOINTMENT (OUTPATIENT)
Dept: ULTRASOUND IMAGING | Facility: CLINIC | Age: 32
End: 2022-07-28
Attending: STUDENT IN AN ORGANIZED HEALTH CARE EDUCATION/TRAINING PROGRAM
Payer: COMMERCIAL

## 2022-07-28 VITALS
OXYGEN SATURATION: 93 % | TEMPERATURE: 98.3 F | RESPIRATION RATE: 22 BRPM | HEART RATE: 79 BPM | DIASTOLIC BLOOD PRESSURE: 74 MMHG | SYSTOLIC BLOOD PRESSURE: 112 MMHG | HEIGHT: 66 IN | WEIGHT: 135 LBS | BODY MASS INDEX: 21.69 KG/M2

## 2022-07-28 DIAGNOSIS — R10.2 PELVIC PAIN IN FEMALE: ICD-10-CM

## 2022-07-28 LAB
ALBUMIN SERPL BCG-MCNC: 4.2 G/DL (ref 3.5–5.2)
ALBUMIN UR-MCNC: NEGATIVE MG/DL
ALP SERPL-CCNC: 51 U/L (ref 35–104)
ALT SERPL W P-5'-P-CCNC: 18 U/L (ref 10–35)
ANION GAP SERPL CALCULATED.3IONS-SCNC: 10 MMOL/L (ref 7–15)
APPEARANCE UR: CLEAR
AST SERPL W P-5'-P-CCNC: 23 U/L (ref 10–35)
BACTERIA #/AREA URNS HPF: ABNORMAL /HPF
BASOPHILS # BLD AUTO: 0.1 10E3/UL (ref 0–0.2)
BASOPHILS NFR BLD AUTO: 1 %
BILIRUB SERPL-MCNC: 0.6 MG/DL
BILIRUB UR QL STRIP: NEGATIVE
BUN SERPL-MCNC: 11.8 MG/DL (ref 6–20)
CALCIUM SERPL-MCNC: 8.8 MG/DL (ref 8.6–10)
CHLORIDE SERPL-SCNC: 107 MMOL/L (ref 98–107)
COLOR UR AUTO: ABNORMAL
CREAT SERPL-MCNC: 0.79 MG/DL (ref 0.51–0.95)
DEPRECATED HCO3 PLAS-SCNC: 21 MMOL/L (ref 22–29)
EOSINOPHIL # BLD AUTO: 0.2 10E3/UL (ref 0–0.7)
EOSINOPHIL NFR BLD AUTO: 2 %
ERYTHROCYTE [DISTWIDTH] IN BLOOD BY AUTOMATED COUNT: 12.4 % (ref 10–15)
GFR SERPL CREATININE-BSD FRML MDRD: >90 ML/MIN/1.73M2
GLUCOSE SERPL-MCNC: 109 MG/DL (ref 70–99)
GLUCOSE UR STRIP-MCNC: NEGATIVE MG/DL
HCT VFR BLD AUTO: 41.8 % (ref 35–47)
HGB BLD-MCNC: 13.7 G/DL (ref 11.7–15.7)
HGB UR QL STRIP: NEGATIVE
HOLD SPECIMEN: NORMAL
IMM GRANULOCYTES # BLD: 0 10E3/UL
IMM GRANULOCYTES NFR BLD: 0 %
KETONES UR STRIP-MCNC: NEGATIVE MG/DL
LEUKOCYTE ESTERASE UR QL STRIP: NEGATIVE
LIPASE SERPL-CCNC: 19 U/L (ref 13–60)
LYMPHOCYTES # BLD AUTO: 1.7 10E3/UL (ref 0.8–5.3)
LYMPHOCYTES NFR BLD AUTO: 16 %
MCH RBC QN AUTO: 30 PG (ref 26.5–33)
MCHC RBC AUTO-ENTMCNC: 32.8 G/DL (ref 31.5–36.5)
MCV RBC AUTO: 92 FL (ref 78–100)
MONOCYTES # BLD AUTO: 0.8 10E3/UL (ref 0–1.3)
MONOCYTES NFR BLD AUTO: 8 %
NEUTROPHILS # BLD AUTO: 7.9 10E3/UL (ref 1.6–8.3)
NEUTROPHILS NFR BLD AUTO: 73 %
NITRATE UR QL: NEGATIVE
NRBC # BLD AUTO: 0 10E3/UL
NRBC BLD AUTO-RTO: 0 /100
PH UR STRIP: 7.5 [PH] (ref 5–7)
PLATELET # BLD AUTO: 192 10E3/UL (ref 150–450)
POTASSIUM SERPL-SCNC: 3.3 MMOL/L (ref 3.4–5.3)
PROT SERPL-MCNC: 6.5 G/DL (ref 6.4–8.3)
RBC # BLD AUTO: 4.56 10E6/UL (ref 3.8–5.2)
RBC URINE: 1 /HPF
SODIUM SERPL-SCNC: 138 MMOL/L (ref 136–145)
SP GR UR STRIP: 1 (ref 1–1.03)
SQUAMOUS EPITHELIAL: 1 /HPF
UROBILINOGEN UR STRIP-MCNC: NORMAL MG/DL
WBC # BLD AUTO: 10.8 10E3/UL (ref 4–11)
WBC URINE: 1 /HPF

## 2022-07-28 PROCEDURE — 93005 ELECTROCARDIOGRAM TRACING: CPT | Performed by: STUDENT IN AN ORGANIZED HEALTH CARE EDUCATION/TRAINING PROGRAM

## 2022-07-28 PROCEDURE — 99285 EMERGENCY DEPT VISIT HI MDM: CPT | Mod: 25 | Performed by: STUDENT IN AN ORGANIZED HEALTH CARE EDUCATION/TRAINING PROGRAM

## 2022-07-28 PROCEDURE — 250N000011 HC RX IP 250 OP 636: Performed by: STUDENT IN AN ORGANIZED HEALTH CARE EDUCATION/TRAINING PROGRAM

## 2022-07-28 PROCEDURE — 83690 ASSAY OF LIPASE: CPT | Performed by: STUDENT IN AN ORGANIZED HEALTH CARE EDUCATION/TRAINING PROGRAM

## 2022-07-28 PROCEDURE — 80053 COMPREHEN METABOLIC PANEL: CPT | Performed by: STUDENT IN AN ORGANIZED HEALTH CARE EDUCATION/TRAINING PROGRAM

## 2022-07-28 PROCEDURE — 76830 TRANSVAGINAL US NON-OB: CPT

## 2022-07-28 PROCEDURE — 93010 ELECTROCARDIOGRAM REPORT: CPT | Mod: 59 | Performed by: STUDENT IN AN ORGANIZED HEALTH CARE EDUCATION/TRAINING PROGRAM

## 2022-07-28 PROCEDURE — 81001 URINALYSIS AUTO W/SCOPE: CPT | Performed by: STUDENT IN AN ORGANIZED HEALTH CARE EDUCATION/TRAINING PROGRAM

## 2022-07-28 PROCEDURE — 76830 TRANSVAGINAL US NON-OB: CPT | Mod: 26 | Performed by: STUDENT IN AN ORGANIZED HEALTH CARE EDUCATION/TRAINING PROGRAM

## 2022-07-28 PROCEDURE — 36415 COLL VENOUS BLD VENIPUNCTURE: CPT | Performed by: EMERGENCY MEDICINE

## 2022-07-28 PROCEDURE — 96374 THER/PROPH/DIAG INJ IV PUSH: CPT | Mod: 59 | Performed by: STUDENT IN AN ORGANIZED HEALTH CARE EDUCATION/TRAINING PROGRAM

## 2022-07-28 PROCEDURE — 74177 CT ABD & PELVIS W/CONTRAST: CPT

## 2022-07-28 PROCEDURE — 85025 COMPLETE CBC W/AUTO DIFF WBC: CPT | Performed by: STUDENT IN AN ORGANIZED HEALTH CARE EDUCATION/TRAINING PROGRAM

## 2022-07-28 PROCEDURE — 76856 US EXAM PELVIC COMPLETE: CPT | Mod: 26 | Performed by: STUDENT IN AN ORGANIZED HEALTH CARE EDUCATION/TRAINING PROGRAM

## 2022-07-28 PROCEDURE — 74177 CT ABD & PELVIS W/CONTRAST: CPT | Mod: 26 | Performed by: STUDENT IN AN ORGANIZED HEALTH CARE EDUCATION/TRAINING PROGRAM

## 2022-07-28 RX ORDER — KETOROLAC TROMETHAMINE 15 MG/ML
15 INJECTION, SOLUTION INTRAMUSCULAR; INTRAVENOUS ONCE
Status: COMPLETED | OUTPATIENT
Start: 2022-07-28 | End: 2022-07-28

## 2022-07-28 RX ORDER — IOPAMIDOL 755 MG/ML
78 INJECTION, SOLUTION INTRAVASCULAR ONCE
Status: COMPLETED | OUTPATIENT
Start: 2022-07-28 | End: 2022-07-28

## 2022-07-28 RX ADMIN — KETOROLAC TROMETHAMINE 15 MG: 15 INJECTION, SOLUTION INTRAMUSCULAR; INTRAVENOUS at 17:15

## 2022-07-28 RX ADMIN — IOPAMIDOL 78 ML: 755 INJECTION, SOLUTION INTRAVENOUS at 20:08

## 2022-07-29 ENCOUNTER — TELEPHONE (OUTPATIENT)
Dept: INTERNAL MEDICINE | Facility: CLINIC | Age: 32
End: 2022-07-29

## 2022-07-29 LAB
ATRIAL RATE - MUSE: 75 BPM
DIASTOLIC BLOOD PRESSURE - MUSE: NORMAL MMHG
INTERPRETATION ECG - MUSE: NORMAL
P AXIS - MUSE: 76 DEGREES
PR INTERVAL - MUSE: 160 MS
QRS DURATION - MUSE: 72 MS
QT - MUSE: 404 MS
QTC - MUSE: 451 MS
R AXIS - MUSE: 71 DEGREES
RADIOLOGIST FLAGS: NORMAL
SYSTOLIC BLOOD PRESSURE - MUSE: NORMAL MMHG
T AXIS - MUSE: 49 DEGREES
VENTRICULAR RATE- MUSE: 75 BPM

## 2022-07-29 NOTE — TELEPHONE ENCOUNTER
"Anne with [facility] calls to report incidental finding on CT Abdomen Pelvis scan from 7/28/22.     Scan ordered in ER, but patient has seen Lakeisha Silverio in our clinic so they are calling our office to report incidental finding. Per CT scan Impression 4:  \"Well-circumscribed fluid density in the left anterior cardiophrenic location may represent cyst likely pericardial cyst. Consider contrast enhanced MRI chest for definitive characterization.\"    *Patient has 2 charts - ER created new chart for her ER visit 7/28/22. Other chart information is under MRN: [id number] and both charts marked for merge.*    Routed to Lakeisha to review for follow-up.     Rita Rivera RN  [facility]   "

## 2022-07-29 NOTE — ED PROVIDER NOTES
"ED Provider Note  [facility]      History     Chief Complaint   Patient presents with     Abdominal Pain     HPI  Danni Delaney is a 32 year old female with history of ovarian cyst who presents with lower abdominal pain.  Reports this has been worsening over the past week, but was acutely worse today.  She denies any fevers, chills or other infectious symptoms.  She has not had any nausea, vomiting or diarrhea.  She has an IUD in place and does not get regular periods.  Denies any abnormal vaginal discharge.  Reports she has been following with gynecology for this issue and has an appointment with them tomorrow.  Also is awaiting a cervical biopsy.    Past Medical History  Ovarian cyst    Allergies   Allergen Reactions     Penicillins      Family History  No family history on file.  Social History       Past medical history, past surgical history, medications, allergies, family history, and social history were reviewed with the patient and in the alternative epic chart marked for merge. No additional pertinent items.       Review of Systems  A complete review of systems was performed with pertinent positives and negatives noted in the HPI, and all other systems negative.    Physical Exam   BP: 103/52  Pulse: 88  Temp: 98.3  F (36.8  C)  Resp: 22  Height: 167.6 cm (5' 6\")  Weight: 61.2 kg (135 lb)  SpO2: 100 %  Physical Exam  General: no acute distress. Appears stated age.   HENT: MMM, no oropharyngeal lesions  Eyes: PERRL, normal sclerae  Neck: non-tender, supple  Cardio: Regular rate. Regular rhythm. Extremities well perfused  Resp: Normal work of breathing, normal respiratory rate.  Chest/Back: no visual signs of trauma, no CVA tenderness  Abdomen: Mild lower abdominal tenderness, non-distended, no rebound, no guarding  Neuro: alert and fully oriented. CN II-XII grossly intact. Grossly normal strength and sensation in all extremities.   MSK: no deformities. Grossly normal ROM in " extremities.   Integumentary/Skin: no rash visualized, normal color  Psych: Anxious and tearful  ED Course      Procedures            EKG Interpretation:      Interpreted by Concepcion Brown MD  Time reviewed: 16:55  Symptoms at time of EKG: none   Rhythm: normal sinus   Rate: normal  Axis: normal  Ectopy: none  Conduction: normal  ST Segments/ T Waves: No ST-T wave changes  Q Waves: none  Comparison to prior: No old EKG available    Clinical Impression: normal EKG                    Results for orders placed or performed during the hospital encounter of 07/28/22   US Pelvic Complete with Transvaginal     Status: None (Preliminary result)    Impression    RESIDENT PRELIMINARY INTERPRETATION  IMPRESSION:   1. Normal pelvic ultrasound.  2. IUD in appropriate position.   CT Abdomen Pelvis w Contrast     Status: None (Preliminary result)    Impression    RESIDENT PRELIMINARY INTERPRETATION  IMPRESSION:   1. No acute abdominal process.  2. Redundant colon with moderate stool burden.   [place] Draw     Status: None    Narrative    The following orders were created for panel order [place] Draw.  Procedure                               Abnormality         Status                     ---------                               -----------         ------                     Extra Red Top Tube[[id number]]                               Final result               Extra Green Top (Lithium...[[id number]]                      Final result               Extra Purple Top Tube[[id number]]                            Final result                 Please view results for these tests on the individual orders.   Extra Red Top Tube     Status: None   Result Value Ref Range    Hold Specimen [facility]    Extra Green Top (Lithium Heparin) Tube     Status: None   Result Value Ref Range    Hold Specimen JIC    Extra Purple Top Tube     Status: None   Result Value Ref Range    Hold Specimen JIC    [place] Draw     Status: None    Narrative    The following orders  were created for panel order [place] Draw.  Procedure                               Abnormality         Status                     ---------                               -----------         ------                     Extra Blue Top Tube[[id number]]                              Final result                 Please view results for these tests on the individual orders.   Extra Blue Top Tube     Status: None   Result Value Ref Range    Hold Specimen [facility]    Comprehensive metabolic panel     Status: Abnormal   Result Value Ref Range    Sodium 138 136 - 145 mmol/L    Potassium 3.3 (L) 3.4 - 5.3 mmol/L    Creatinine 0.79 0.51 - 0.95 mg/dL    Urea Nitrogen 11.8 6.0 - 20.0 mg/dL    Chloride 107 98 - 107 mmol/L    Carbon Dioxide (CO2) 21 (L) 22 - 29 mmol/L    Anion Gap 10 7 - 15 mmol/L    Glucose 109 (H) 70 - 99 mg/dL    Calcium 8.8 8.6 - 10.0 mg/dL    Protein Total 6.5 6.4 - 8.3 g/dL    Albumin 4.2 3.5 - 5.2 g/dL    Bilirubin Total 0.6 <=1.2 mg/dL    Alkaline Phosphatase 51 35 - 104 U/L    AST 23 10 - 35 U/L    ALT 18 10 - 35 U/L    GFR Estimate >90 >60 mL/min/1.73m2   Lipase     Status: Normal   Result Value Ref Range    Lipase 19 13 - 60 U/L   CBC with platelets and differential     Status: None   Result Value Ref Range    WBC Count 10.8 4.0 - 11.0 10e3/uL    RBC Count 4.56 3.80 - 5.20 10e6/uL    Hemoglobin 13.7 11.7 - 15.7 g/dL    Hematocrit 41.8 35.0 - 47.0 %    MCV 92 78 - 100 fL    MCH 30.0 26.5 - 33.0 pg    MCHC 32.8 31.5 - 36.5 g/dL    RDW 12.4 10.0 - 15.0 %    Platelet Count 192 150 - 450 10e3/uL    % Neutrophils 73 %    % Lymphocytes 16 %    % Monocytes 8 %    % Eosinophils 2 %    % Basophils 1 %    % Immature Granulocytes 0 %    NRBCs per 100 WBC 0 <1 /100    Absolute Neutrophils 7.9 1.6 - 8.3 10e3/uL    Absolute Lymphocytes 1.7 0.8 - 5.3 10e3/uL    Absolute Monocytes 0.8 0.0 - 1.3 10e3/uL    Absolute Eosinophils 0.2 0.0 - 0.7 10e3/uL    Absolute Basophils 0.1 0.0 - 0.2 10e3/uL    Absolute Immature Granulocytes 0.0  <=0.4 10e3/uL    Absolute NRBCs 0.0 10e3/uL   UA with Microscopic reflex to Culture     Status: Abnormal    Specimen: Urine, Clean Catch   Result Value Ref Range    Color Urine Straw Colorless, Straw, Light Yellow, Yellow    Appearance Urine Clear Clear    Glucose Urine Negative Negative mg/dL    Bilirubin Urine Negative Negative    Ketones Urine Negative Negative mg/dL    Specific Gravity Urine 1.005 1.003 - 1.035    Blood Urine Negative Negative    pH Urine 7.5 (H) 5.0 - 7.0    Protein Albumin Urine Negative Negative mg/dL    Urobilinogen Urine Normal Normal, 2.0 mg/dL    Nitrite Urine Negative Negative    Leukocyte Esterase Urine Negative Negative    Bacteria Urine Few (A) None Seen /HPF    RBC Urine 1 <=2 /HPF    WBC Urine 1 <=5 /HPF    Squamous Epithelials Urine 1 <=1 /HPF    Narrative    Urine Culture not indicated   CBC with platelets differential     Status: None    Narrative    The following orders were created for panel order CBC with platelets differential.  Procedure                               Abnormality         Status                     ---------                               -----------         ------                     CBC with platelets and d...[[id number]]                      Final result                 Please view results for these tests on the individual orders.     Medications   ketorolac (TORADOL) injection 15 mg (15 mg Intravenous Given 7/28/22 1715)   iopamidol (ISOVUE-370) solution 78 mL (78 mLs Intravenous Given 7/28/22 2008)   sodium chloride (PF) 0.9% PF flush 71 mL (71 mLs Intravenous Given 7/28/22 2009)        Assessments & Plan (with Medical Decision Making)   Danni Delaney is a 32-year-old female with history of ovarian cyst presenting for lower abdominal pain.  Differential at this time includes ovarian torsion, ovarian cyst, ectopic pregnancy, STIs, appendicitis or other intra-abdominal pathology.  Initial work-up to include CBC, CMP, lipase, urinalysis and pregnancy  test.  Initially planning for pelvic exam with STI swabs, however patient declined this exam.    CBC and CMP unremarkable.  Lipase within normal limits.  Urinalysis without signs of infection.  Pregnancy test was negative.  Obtained transvaginal pelvic ultrasound which showed blood flow to bilateral ovaries and no obvious source for pain.  CT abdomen pelvis was also nondiagnostic for her pain.  She has close follow-up with her OB/GYN provider tomorrow.  Pain slightly improved with Toradol in the emergency department.  Vital signs remained stable throughout.  Discussed return precautions for worsening symptoms.      I have reviewed the nursing notes. I have reviewed the findings, diagnosis, plan and need for follow up with the patient.    There are no discharge medications for this patient.      Final diagnoses:   Pelvic pain in female       --  Concepcion Brown MD  [facility] EMERGENCY DEPARTMENT  7/28/2022     Concepcion Brown MD  Resident  07/29/22 0019

## 2022-07-29 NOTE — DISCHARGE INSTRUCTIONS
You were seen today for abdominal pain.  Your tests today did not show any emergent cause of your pain.  Please follow up with your OBGYN tomorrow.  Return to the ED with any new or concerning symptoms.

## 2022-07-29 NOTE — ED TRIAGE NOTES
Danni BIBA for left abdominal pain which radiates to the back. Pain 10/10, patient tearful and crying d/t intensity, 18G IV placed by EMS. Fentanyl given by EMS. Patent spouse and mother with patient. Lab and lined   Triage Assessment     Row Name 07/28/22 1926       Triage Assessment (Adult)    Airway WDL WDL       Respiratory WDL    Respiratory WDL WDL       Skin Circulation/Temperature WDL    Skin Circulation/Temperature WDL WDL       Cardiac WDL    Cardiac WDL WDL       Peripheral/Neurovascular WDL    Peripheral Neurovascular WDL WDL       [place] Coma Scale    Best Eye Response 4-->(E4) spontaneous    Best Motor Response 6-->(M6) obeys commands    Best Verbal Response 5-->(V5) oriented    [place] Coma Scale Score 15

## 2022-08-01 NOTE — TELEPHONE ENCOUNTER
Call to patient. Message left for return call to clinic.     Nanda RIOS RN   [facility]

## 2022-08-01 NOTE — TELEPHONE ENCOUNTER
Advise pt incidental finding of possible pericardial cyst on CT from ER 7/28/22.  Advise f/u with PCP, may suggest repeat CT in 6-12 months  Lakeisha Silverio CNP

## 2022-08-04 NOTE — TELEPHONE ENCOUNTER
Attempted to contact pt. Left message to call clinic.   Please transfer call to any triage Nurse when she calls back.     Pt should schedule a physical exam with provider.   Not sure if she has a Primary provider?  Last OV on 8/12/21 for back pain.       (Pericardial cysts are noncancerous growths in your pericardium, which is the fluid-filled sac that surrounds your heart. About 75% of people don't have symptoms. You may have symptoms if the cyst presses on your heart or lungs. Rarely, a pericardial cyst can lead to serious complications.)

## 2022-08-05 NOTE — TELEPHONE ENCOUNTER
3 messages left. She didn't call back.   Will send back to Lakeisha. If no further action needed, please close.

## 2022-10-04 NOTE — TELEPHONE ENCOUNTER
Patient Quality Outreach    Patient is due for the following:   Cervical Cancer Screening - PAP Needed  Physical Preventive Adult Physical    Next Steps:   Schedule a Adult Preventative    Type of outreach:    Sent letter.    Next Steps:  Reach out within 90 days via Phone.    Max number of attempts reached: No. Will try again in 90 days if patient still on fail list.    Questions for provider review:    None     Bre Reese MA  Chart routed to no one.

## 2022-10-10 ENCOUNTER — HEALTH MAINTENANCE LETTER (OUTPATIENT)
Age: 32
End: 2022-10-10

## 2023-05-27 ENCOUNTER — HEALTH MAINTENANCE LETTER (OUTPATIENT)
Age: 33
End: 2023-05-27

## 2023-12-27 ENCOUNTER — LAB REQUISITION (OUTPATIENT)
Dept: LAB | Facility: CLINIC | Age: 33
End: 2023-12-27

## 2023-12-27 PROCEDURE — 87635 SARS-COV-2 COVID-19 AMP PRB: CPT | Performed by: INTERNAL MEDICINE

## 2023-12-28 LAB — SARS-COV-2 RNA RESP QL NAA+PROBE: NEGATIVE

## 2023-12-29 ENCOUNTER — LAB REQUISITION (OUTPATIENT)
Dept: LAB | Facility: CLINIC | Age: 33
End: 2023-12-29

## 2023-12-29 PROCEDURE — 87635 SARS-COV-2 COVID-19 AMP PRB: CPT | Performed by: INTERNAL MEDICINE

## 2023-12-30 LAB — SARS-COV-2 RNA RESP QL NAA+PROBE: NEGATIVE

## 2024-04-19 ENCOUNTER — PATIENT OUTREACH (OUTPATIENT)
Dept: CARE COORDINATION | Facility: CLINIC | Age: 34
End: 2024-04-19

## 2024-04-19 NOTE — PROGRESS NOTES
Clinical Product Navigator RN reviewed chart; patient on payer product coverage.  Review results:   CPN Initial Information Gathering  Referral Source: Health Plan     Patient identified by health plan for potential care management. Per chart review, patient has an out of network PCP and has no utilized [facility] since 2022. Health plan to follow for care management    Yareli Vasquez RN  Clinical Product Navigator  Ph: [phone]

## 2024-10-12 ENCOUNTER — HEALTH MAINTENANCE LETTER (OUTPATIENT)
Age: 34
End: 2024-10-12